# Patient Record
Sex: FEMALE | Race: WHITE | NOT HISPANIC OR LATINO | URBAN - NONMETROPOLITAN AREA
[De-identification: names, ages, dates, MRNs, and addresses within clinical notes are randomized per-mention and may not be internally consistent; named-entity substitution may affect disease eponyms.]

---

## 2018-04-15 ENCOUNTER — OFFICE VISIT (OUTPATIENT)
Dept: URGENT CARE | Facility: PHYSICIAN GROUP | Age: 68
End: 2018-04-15
Payer: MEDICARE

## 2018-04-15 VITALS
HEIGHT: 66 IN | RESPIRATION RATE: 16 BRPM | SYSTOLIC BLOOD PRESSURE: 122 MMHG | BODY MASS INDEX: 31.84 KG/M2 | HEART RATE: 65 BPM | OXYGEN SATURATION: 97 % | WEIGHT: 198.1 LBS | TEMPERATURE: 98.3 F | DIASTOLIC BLOOD PRESSURE: 78 MMHG

## 2018-04-15 DIAGNOSIS — R21 RASH: ICD-10-CM

## 2018-04-15 PROCEDURE — 99203 OFFICE O/P NEW LOW 30 MIN: CPT | Performed by: PHYSICIAN ASSISTANT

## 2018-04-15 RX ORDER — LOSARTAN POTASSIUM 50 MG/1
50 TABLET ORAL DAILY
COMMUNITY

## 2018-04-15 RX ORDER — METHYLPREDNISOLONE 4 MG/1
TABLET ORAL
Qty: 21 TAB | Refills: 0 | Status: SHIPPED | OUTPATIENT
Start: 2018-04-15 | End: 2019-09-15

## 2018-04-15 NOTE — PROGRESS NOTES
Chief Complaint   Patient presents with   • Rash     was taking a medication for a uti a it gave her a rash       HISTORY OF PRESENT ILLNESS: Patient is a 68 y.o. female who presents today for the following:    Hives x 2 days  Started macrobid 1 week prior to sx onset  Has 10 day course for UTI from [facility] in [place]  OTC meds tried: benadryl didn't help  Denies SOB, facial swelling  + itching     Patient Active Problem List    Diagnosis Date Noted   • Abnormal stress ECG with treadmill 2012     Priority: High   • CHEST PAIN 2012   • Dyspnea 2012   • Palpitations 2012   • Hyperlipidemia 2011   • Hypothyroid 2011   • Autoimmune hepatitis (CMS-HCC) 2011   • Menopause 2011   • Autoimmune hepatitis (CMS-HCC) 2010   • Post-hysterectomy menopause 2010   • Hypothyroid 2010       Allergies:Sulfa drugs; Codeine; Demerol; and Sulfa drugs    Current Outpatient Prescriptions Ordered in [facility]   Medication Sig Dispense Refill   • losartan (COZAAR) 50 MG Tab Take 50 mg by mouth every day.     • MethylPREDNISolone (MEDROL DOSEPAK) 4 MG Tablet Therapy Pack Use as package directs 21 Tab 0   • Multiple Vitamin (MULTI-DAY VITAMINS PO) Take  by mouth every day.     • levothyroxine (SYNTHROID) 50 MCG TABS Take 50 mcg by mouth every day.     • AZATHIOPRINE by Does not apply route.     • ciprofloxacin (CIPRO) 500 MG TABS Take 1 Tab by mouth 2 times a day. 14 Each 0   • metronidazole (FLAGYL) 500 MG TABS Take 1 Tab by mouth every 8 hours. 21 Each 0   • docosahexanoic acid (OMEGA 3 FA) 1000 MG CAPS Take 1,000 mg by mouth every day.     • NON SPECIFIED 50 mg every day. AzatioprineFor autoimmune      • levothyroxine (SYNTHROID) 50 MCG TABS Take 50 mcg by mouth every day.     • estrogens conjugated, synthetic B, (ENJUVIA) 0.3 MG tablet Take 0.3 mg by mouth every day.       No current [facility]-ordered facility-administered medications on file.        Past Medical History:  "  Diagnosis Date   • Autoimmune hepatitis (CMS-HCC) 2010   • Autoimmune hepatitis (CMS-HCC) 2011   • CHEST PAIN 3/26/2012   • Dyspnea 3/26/2012   • Hypothyroid 2010   • Hypothyroid 2011   • Menopause 2011   • Palpitations 3/26/2012   • Post-hysterectomy menopause 2010       Social History   Substance Use Topics   • Smoking status: Never Smoker   • Smokeless tobacco: Never Used   • Alcohol use No       Family Status   Relation Status   • Maternal Grandmother    • Maternal Grandfather    • Paternal Grandmother    • Paternal Grandfather    • Mother     Cirrhosis   • Father    • Sister Alive   • Brother Alive   • Sister Alive     Family History   Problem Relation Age of Onset   • Diabetes Paternal Grandmother    • Hypertension Mother    • Diabetes Father    • Cancer Father      Pancreatic   • Hypertension Sister        ROS:    Review of Systems   Constitutional: Negative for fever, chills, weight loss and malaise/fatigue.   HENT: Negative for ear pain, nosebleeds, congestion, sore throat and neck pain.    Eyes: Negative for blurred vision.   Respiratory: Negative for cough, sputum production, shortness of breath and wheezing.    Cardiovascular: Negative for chest pain, palpitations, orthopnea and leg swelling.   Gastrointestinal: Negative for heartburn, nausea, vomiting and abdominal pain.   Genitourinary: Negative for dysuria, urgency and frequency.       Exam:  Blood pressure 122/78, pulse 65, temperature 36.8 °C (98.3 °F), resp. rate 16, height 1.676 m (5' 6\"), weight 89.9 kg (198 lb 1.6 oz), SpO2 97 %.  General: Well developed, well nourished. No distress.  HEENT: Head is grossly normal. No angioedema noted.  Pulmonary: Clear to ausculation and percussion.  Normal effort. No rales, ronchi, or wheezing.   Cardiovascular: Regular rate and rhythm without murmur. No edema.   Skin: Scattered erythematous slightly raised lesions on the chest, " neck, and face.  Psych: Normal mood. Alert and oriented x3. Judgment and insight is normal.    Assessment/Plan:  Lesions are not urticarial in nature but do saqib with pressure and itch. He was on medication as directed. Follow up for worsening or persistent symptoms.  1. Rash  MethylPREDNISolone (MEDROL DOSEPAK) 4 MG Tablet Therapy Pack

## 2018-04-20 ENCOUNTER — APPOINTMENT (OUTPATIENT)
Dept: URGENT CARE | Facility: PHYSICIAN GROUP | Age: 68
End: 2018-04-20
Payer: MEDICARE

## 2018-04-20 ENCOUNTER — APPOINTMENT (OUTPATIENT)
Dept: RADIOLOGY | Facility: IMAGING CENTER | Age: 68
End: 2018-04-20
Attending: NURSE PRACTITIONER
Payer: MEDICARE

## 2018-04-20 DIAGNOSIS — M25.512 PAIN, JOINT, SHOULDER, LEFT: ICD-10-CM

## 2018-04-20 DIAGNOSIS — M25.612 DECREASED ROM OF LEFT SHOULDER: ICD-10-CM

## 2018-04-20 PROCEDURE — 73030 X-RAY EXAM OF SHOULDER: CPT | Mod: TC,FY,LT | Performed by: FAMILY MEDICINE

## 2019-09-15 ENCOUNTER — OFFICE VISIT (OUTPATIENT)
Dept: URGENT CARE | Facility: PHYSICIAN GROUP | Age: 69
End: 2019-09-15
Payer: MEDICARE

## 2019-09-15 VITALS
DIASTOLIC BLOOD PRESSURE: 82 MMHG | WEIGHT: 185 LBS | BODY MASS INDEX: 29.86 KG/M2 | TEMPERATURE: 97.8 F | RESPIRATION RATE: 14 BRPM | SYSTOLIC BLOOD PRESSURE: 138 MMHG | HEART RATE: 56 BPM | OXYGEN SATURATION: 97 %

## 2019-09-15 DIAGNOSIS — H10.31 ACUTE BACTERIAL CONJUNCTIVITIS OF RIGHT EYE: ICD-10-CM

## 2019-09-15 PROCEDURE — 99214 OFFICE O/P EST MOD 30 MIN: CPT | Performed by: FAMILY MEDICINE

## 2019-09-15 RX ORDER — KETOROLAC TROMETHAMINE 5 MG/ML
SOLUTION OPHTHALMIC
Qty: 5 ML | Refills: 1 | Status: SHIPPED | OUTPATIENT
Start: 2019-09-15

## 2019-09-15 RX ORDER — CETIRIZINE HYDROCHLORIDE 10 MG/1
10 TABLET ORAL DAILY
COMMUNITY

## 2019-09-15 RX ORDER — OFLOXACIN 3 MG/ML
SOLUTION/ DROPS OPHTHALMIC
Qty: 5 ML | Refills: 1 | Status: SHIPPED | OUTPATIENT
Start: 2019-09-15

## 2019-09-15 NOTE — LETTER
September 15, 2019         Patient: Shruthi Rivera   YOB: 1950   Date of Visit: 9/15/2019           To Whom it May Concern:    Shruthi Rivera was seen in my clinic on 9/15/2019.     Please excuse from work for 9/15/19 due to medical condition.    If you have any questions or concerns, please don't hesitate to call.        Sincerely,           Dannie Houston M.D.  Electronically Signed

## 2019-09-15 NOTE — PROGRESS NOTES
Chief Complaint:    Chief Complaint   Patient presents with   • Eye Problem     R eye redness    • Letter for School/Work       History of Present Illness:    This is a new problem. She has right eye redness, pain, and watering that started yesterday AM upon awakening. Was fine the night before. Overall at least moderate severity and not getting better. No contact lens use, foreign body to eye, or change in vision. No similar prior episodes.      Review of Systems:    Constitutional: Negative for fever, chills, and diaphoresis.   Eyes: See HPI.  ENT: Negative for ear pain, ear discharge, hearing loss, tinnitus, nasal congestion, nosebleeds, and sore throat.    Respiratory: Negative for cough, hemoptysis, sputum production, shortness of breath, wheezing, and stridor.    Cardiovascular: Negative for chest pain, palpitations, orthopnea, claudication, leg swelling, and PND.   Gastrointestinal: Negative for abdominal pain, nausea, vomiting, diarrhea, constipation, blood in stool, and melena.   Genitourinary: Negative for dysuria, urinary urgency, urinary frequency, hematuria, and flank pain.   Musculoskeletal: Negative for myalgias, joint pain, neck pain, and back pain.   Skin: Negative for rash and itching.   Neurological: Negative for dizziness, tingling, tremors, sensory change, speech change, focal weakness, seizures, loss of consciousness, and headaches.   Endo: Negative for polydipsia.   Heme: Does not bruise/bleed easily.   Psychiatric/Behavioral: Negative for depression, suicidal ideas, hallucinations, memory loss and substance abuse. The patient is not nervous/anxious and does not have insomnia.        Past Medical History:    Past Medical History:   Diagnosis Date   • Autoimmune hepatitis (HCC) 12/8/2010   • Autoimmune hepatitis (HCC) 2/23/2011   • CHEST PAIN 3/26/2012   • Dyspnea 3/26/2012   • Hypothyroid 12/8/2010   • Hypothyroid 2/23/2011   • Menopause 2/23/2011   • Palpitations 3/26/2012   •  Post-hysterectomy menopause 12/8/2010     Past Surgical History:    Past Surgical History:   Procedure Laterality Date   • KNEE RECONSTRUCTION  2003   • CHOLECYSTECTOMY  1993   • ABDOMINAL HYSTERECTOMY TOTAL  1992   • ABDOMINAL HYSTERECTOMY TOTAL      Endometriosis   • CHOLECYSTECTOMY       Social History:    Social History     Socioeconomic History   • Marital status:      Spouse name: Not on file   • Number of children: Not on file   • Years of education: Not on file   • Highest education level: Not on file   Occupational History   • Not on file   Social Needs   • Financial resource strain: Not on file   • Food insecurity:     Worry: Not on file     Inability: Not on file   • Transportation needs:     Medical: Not on file     Non-medical: Not on file   Tobacco Use   • Smoking status: Never Smoker   • Smokeless tobacco: Never Used   Substance and Sexual Activity   • Alcohol use: No   • Drug use: No   • Sexual activity: Yes     Partners: Male   Lifestyle   • Physical activity:     Days per week: Not on file     Minutes per session: Not on file   • Stress: Not on file   Relationships   • Social connections:     Talks on phone: Not on file     Gets together: Not on file     Attends Sabianism service: Not on file     Active member of club or organization: Not on file     Attends meetings of clubs or organizations: Not on file     Relationship status: Not on file   • Intimate partner violence:     Fear of current or ex partner: Not on file     Emotionally abused: Not on file     Physically abused: Not on file     Forced sexual activity: Not on file   Other Topics Concern   • Not on file   Social History Narrative    ** Merged History Encounter **          Family History:    Family History   Problem Relation Age of Onset   • Diabetes Paternal Grandmother    • Hypertension Mother    • Diabetes Father    • Cancer Father         Pancreatic   • Hypertension Sister      Medications:    Current Outpatient Medications on  File Prior to Visit   Medication Sig Dispense Refill   • cetirizine (ZYRTEC) 10 MG Tab Take 10 mg by mouth every day.     • losartan (COZAAR) 50 MG Tab Take 50 mg by mouth every day.     • AZATHIOPRINE by Does not apply route.     • Multiple Vitamin (MULTI-DAY VITAMINS PO) Take  by mouth every day.     • docosahexanoic acid (OMEGA 3 FA) 1000 MG CAPS Take 1,000 mg by mouth every day.     • NON SPECIFIED 50 mg every day. AzatioprineFor autoimmune      • levothyroxine (SYNTHROID) 50 MCG TABS Take 50 mcg by mouth every day.     • estrogens conjugated, synthetic B, (ENJUVIA) 0.3 MG tablet Take 0.3 mg by mouth every day.       No current facility-administered medications on file prior to visit.      Allergies:    Allergies   Allergen Reactions   • Sulfa Drugs Hives   • Codeine    • Demerol    • Sulfa Drugs        Vitals:    Vitals:    09/15/19 1311   BP: 138/82   Pulse: (!) 56   Resp: 14   Temp: 36.6 °C (97.8 °F)   TempSrc: Temporal   SpO2: 97%   Weight: 83.9 kg (185 lb)       Physical Exam:    Constitutional: Vital signs reviewed. Appears well-developed and well-nourished. No acute distress.   Eyes: Right conjunctiva is moderately injected with clear watering. Left sclera white, left conjunctiva clear. PERRLA.  ENT: External ears normal. Hearing normal.   Neck: Neck supple.   Pulmonary/Chest: Respirations non-labored.   Musculoskeletal: Normal gait. Normal range of motion. No muscular atrophy or weakness.  Neurological: Alert and oriented to person, place, and time. CN 2-12 intact. Muscle tone normal. Coordination normal.   Skin: No rashes or lesions. Warm, dry, normal turgor.  Psychiatric: Normal mood and affect. Behavior is normal. Judgment and thought content normal.       Assessment / Plan:    1. Acute bacterial conjunctivitis of right eye  - ofloxacin (OCUFLOX) 0.3 % Solution; 1-2 DROPS TO AFFECTED EYE EVERY 2-4 HOURS WHILE AWAKE. USE UNTIL BETTER AND FOR ONE EXTRA DAY AFTER BETTER.  Dispense: 5 mL; Refill:  1  - ketorolac (ACULAR) 0.5 % Solution; 1 DROP TO AFFECTED EYE EVERY 6 HOURS ONLY IF NEEDED FOR PAIN AND INFLAMMATION.  Dispense: 5 mL; Refill: 1      Work note given - excuse for 9/15/19.     Discussed with her DDX, management options, and risks, benefits, and alternatives to treatment plan agreed upon.    Agreeable to medications prescribed.    Recommended good hand hygiene, wash linens and towels.    Discussed expected course of duration, time for improvement, and to seek follow-up in Emergency Room, urgent care, or with PCP if getting worse at any time or not improving within expected time frame.